# Patient Record
Sex: MALE | Race: BLACK OR AFRICAN AMERICAN | NOT HISPANIC OR LATINO | URBAN - NONMETROPOLITAN AREA
[De-identification: names, ages, dates, MRNs, and addresses within clinical notes are randomized per-mention and may not be internally consistent; named-entity substitution may affect disease eponyms.]

---

## 2024-05-20 ENCOUNTER — OFFICE (OUTPATIENT)
Dept: URBAN - NONMETROPOLITAN AREA CLINIC 1 | Facility: CLINIC | Age: 59
End: 2024-05-20
Payer: COMMERCIAL

## 2024-05-20 VITALS
WEIGHT: 180 LBS | HEIGHT: 70 IN | DIASTOLIC BLOOD PRESSURE: 79 MMHG | SYSTOLIC BLOOD PRESSURE: 148 MMHG | HEART RATE: 99 BPM

## 2024-05-20 DIAGNOSIS — R74.8 ABNORMAL LEVELS OF OTHER SERUM ENZYMES: ICD-10-CM

## 2024-05-20 DIAGNOSIS — R76.8 OTHER SPECIFIED ABNORMAL IMMUNOLOGICAL FINDINGS IN SERUM: ICD-10-CM

## 2024-05-20 DIAGNOSIS — Z12.11 ENCOUNTER FOR SCREENING FOR MALIGNANT NEOPLASM OF COLON: ICD-10-CM

## 2024-05-20 PROCEDURE — 99204 OFFICE O/P NEW MOD 45 MIN: CPT | Performed by: NURSE PRACTITIONER

## 2024-05-20 PROCEDURE — 36415 COLL VENOUS BLD VENIPUNCTURE: CPT | Performed by: NURSE PRACTITIONER

## 2024-05-20 NOTE — SERVICENOTES
Needs colon screening, but he declines colonoscopy at this time, will rtc for this appt. 
at least recommend cologuard and he is agreeable with this at this time. 
Thorough hepatic serologic lab workup today 
Reviewed labs as above-depending on workup above, consider abdominal u/s in future
F/u with pcp regarding elevated blood presure.
Avoid NSAIDs.
Smoking cessation discussed in office.

## 2024-05-20 NOTE — SERVICEHPINOTES
Patient with a history of hallucinations presents to the clinic today for positive Hepatitis A antibody.  Denies any recent travel outside of the [place], or recent n/v/d or viral gastroenteritis illness.  He denies any fever, abdominal pain, n/v/d, unintentional weight loss, melena, hematochezia, or change in BM.  He has a good appetite and has 1 BM daily.  He states he lives alone in his home in which he rents.  He cooks his own food with occasional out to eat at [employer] and [employer].  He denies ETOH use and will drink a beer occasionally once or twice yearly.  He denies a history of known liver disease.  Has never had a colonoscopy in the past. br
br Labs from PCP dated 5/2023
br TSH 0.015, ALP 73, ALT 51, AST 31, Tbili 0.7, WBC 8.4, Hgb 15.6, Hct 46.6, platelet 341  br
br

## 2024-12-20 ENCOUNTER — OFFICE (OUTPATIENT)
Dept: URBAN - NONMETROPOLITAN AREA CLINIC 1 | Facility: CLINIC | Age: 59
End: 2024-12-20
Payer: COMMERCIAL

## 2024-12-20 VITALS
DIASTOLIC BLOOD PRESSURE: 103 MMHG | WEIGHT: 214 LBS | HEART RATE: 69 BPM | HEIGHT: 70 IN | SYSTOLIC BLOOD PRESSURE: 155 MMHG | DIASTOLIC BLOOD PRESSURE: 93 MMHG | SYSTOLIC BLOOD PRESSURE: 149 MMHG

## 2024-12-20 DIAGNOSIS — R76.8 OTHER SPECIFIED ABNORMAL IMMUNOLOGICAL FINDINGS IN SERUM: ICD-10-CM

## 2024-12-20 DIAGNOSIS — R74.8 ABNORMAL LEVELS OF OTHER SERUM ENZYMES: ICD-10-CM

## 2024-12-20 PROCEDURE — 36415 COLL VENOUS BLD VENIPUNCTURE: CPT | Performed by: NURSE PRACTITIONER

## 2024-12-20 PROCEDURE — 99213 OFFICE O/P EST LOW 20 MIN: CPT | Performed by: NURSE PRACTITIONER

## 2024-12-20 NOTE — SERVICEHPINOTES
Patient presents to the clinic today for follow up.  He was referred for elevated lfts and positive Hep A.  Labs revealed patient had Hep A Ab total positive with neg Hep A IgM.  HENRI pos 1:160 speckled, actin smooth muscle antibody elevated and follow up liver bx no steatosis, no evidence of cholestatic change or bile duct proliferation, no evidence of active autoimmune hepatitis at this time.  Denies melena, hematochezia, n/v/d, abdominal pain, unintentional weight loss, or change in BM.  Has a BM daily or qod.  Denies any upper gi complaints or dysphagia to speak of.  Smokes 1/2 cigarettes daily.  Denies any joint pain.  He was recently diagnosed with thyroid disease and started on Methimazole.   Cologuard 5/24-neg.

## 2024-12-20 NOTE — SERVICENOTES
Will continue to monitor lfts, he feels well, and denies any s/s or complaints at this time. 
Will obtain Abd u/s of liver as well- will schedule this for patient
Increase fiber and water intake daily
Avoid all NSAIDs.
F/u with pcp on elevated BP.

## 2025-01-03 ENCOUNTER — OFFICE (OUTPATIENT)
Dept: URBAN - NONMETROPOLITAN AREA CLINIC 1 | Facility: CLINIC | Age: 60
End: 2025-01-03
Payer: COMMERCIAL

## 2025-01-03 VITALS — HEIGHT: 70 IN

## 2025-01-03 DIAGNOSIS — R74.8 ABNORMAL LEVELS OF OTHER SERUM ENZYMES: ICD-10-CM

## 2025-01-03 PROCEDURE — 76700 US EXAM ABDOM COMPLETE: CPT | Mod: TC | Performed by: NURSE PRACTITIONER

## 2025-01-17 ENCOUNTER — OFFICE (OUTPATIENT)
Dept: URBAN - NONMETROPOLITAN AREA CLINIC 1 | Facility: CLINIC | Age: 60
End: 2025-01-17
Payer: COMMERCIAL

## 2025-01-17 VITALS — HEIGHT: 70 IN

## 2025-01-17 DIAGNOSIS — K76.0 FATTY (CHANGE OF) LIVER, NOT ELSEWHERE CLASSIFIED: ICD-10-CM

## 2025-01-17 DIAGNOSIS — R74.8 ABNORMAL LEVELS OF OTHER SERUM ENZYMES: ICD-10-CM

## 2025-01-17 PROCEDURE — 76981 USE PARENCHYMA: CPT | Performed by: NURSE PRACTITIONER

## 2025-08-21 ENCOUNTER — OFFICE (OUTPATIENT)
Dept: URBAN - NONMETROPOLITAN AREA CLINIC 1 | Facility: CLINIC | Age: 60
End: 2025-08-21
Payer: COMMERCIAL

## 2025-08-21 VITALS
HEART RATE: 77 BPM | DIASTOLIC BLOOD PRESSURE: 84 MMHG | SYSTOLIC BLOOD PRESSURE: 169 MMHG | WEIGHT: 235 LBS | HEIGHT: 70 IN | DIASTOLIC BLOOD PRESSURE: 87 MMHG | SYSTOLIC BLOOD PRESSURE: 154 MMHG

## 2025-08-21 DIAGNOSIS — K76.0 FATTY (CHANGE OF) LIVER, NOT ELSEWHERE CLASSIFIED: ICD-10-CM

## 2025-08-21 DIAGNOSIS — R74.8 ABNORMAL LEVELS OF OTHER SERUM ENZYMES: ICD-10-CM

## 2025-08-21 PROCEDURE — 99213 OFFICE O/P EST LOW 20 MIN: CPT | Performed by: NURSE PRACTITIONER
